# Patient Record
Sex: FEMALE | Race: WHITE | Employment: OTHER | URBAN - NONMETROPOLITAN AREA
[De-identification: names, ages, dates, MRNs, and addresses within clinical notes are randomized per-mention and may not be internally consistent; named-entity substitution may affect disease eponyms.]

---

## 2020-12-02 ENCOUNTER — ANESTHESIA EVENT (OUTPATIENT)
Dept: OPERATING ROOM | Age: 85
DRG: 522 | End: 2020-12-02
Payer: MEDICARE

## 2020-12-02 ENCOUNTER — HOSPITAL ENCOUNTER (INPATIENT)
Age: 85
LOS: 3 days | Discharge: NURSING, REHAB OR LONG-TERM CARE | DRG: 522 | End: 2020-12-05
Attending: STUDENT IN AN ORGANIZED HEALTH CARE EDUCATION/TRAINING PROGRAM | Admitting: HOSPITALIST
Payer: MEDICARE

## 2020-12-02 ENCOUNTER — ANESTHESIA (OUTPATIENT)
Dept: OPERATING ROOM | Age: 85
DRG: 522 | End: 2020-12-02
Payer: MEDICARE

## 2020-12-02 VITALS
SYSTOLIC BLOOD PRESSURE: 166 MMHG | RESPIRATION RATE: 32 BRPM | DIASTOLIC BLOOD PRESSURE: 74 MMHG | OXYGEN SATURATION: 91 % | TEMPERATURE: 97.3 F

## 2020-12-02 PROBLEM — S72.001A HIP FRACTURE, RIGHT, CLOSED, INITIAL ENCOUNTER (HCC): Status: ACTIVE | Noted: 2020-12-02

## 2020-12-02 LAB
ANION GAP SERPL CALCULATED.3IONS-SCNC: 8 MMOL/L (ref 7–19)
BUN BLDV-MCNC: 30 MG/DL (ref 8–23)
CALCIUM SERPL-MCNC: 9.5 MG/DL (ref 8.8–10.2)
CHLORIDE BLD-SCNC: 106 MMOL/L (ref 98–111)
CO2: 26 MMOL/L (ref 22–29)
CREAT SERPL-MCNC: 1 MG/DL (ref 0.5–0.9)
GFR AFRICAN AMERICAN: >59
GFR NON-AFRICAN AMERICAN: 53
GLUCOSE BLD-MCNC: 147 MG/DL (ref 74–109)
HCT VFR BLD CALC: 31.8 % (ref 37–47)
HEMOGLOBIN: 10.2 G/DL (ref 12–16)
MCH RBC QN AUTO: 30.1 PG (ref 27–31)
MCHC RBC AUTO-ENTMCNC: 32.1 G/DL (ref 33–37)
MCV RBC AUTO: 93.8 FL (ref 81–99)
PDW BLD-RTO: 13.2 % (ref 11.5–14.5)
PLATELET # BLD: 302 K/UL (ref 130–400)
PMV BLD AUTO: 10.6 FL (ref 9.4–12.3)
POTASSIUM REFLEX MAGNESIUM: 4.4 MMOL/L (ref 3.5–5)
RBC # BLD: 3.39 M/UL (ref 4.2–5.4)
SODIUM BLD-SCNC: 140 MMOL/L (ref 136–145)
WBC # BLD: 14.2 K/UL (ref 4.8–10.8)

## 2020-12-02 PROCEDURE — 6360000002 HC RX W HCPCS: Performed by: ORTHOPAEDIC SURGERY

## 2020-12-02 PROCEDURE — 2580000003 HC RX 258: Performed by: NURSE ANESTHETIST, CERTIFIED REGISTERED

## 2020-12-02 PROCEDURE — 2580000003 HC RX 258: Performed by: HOSPITALIST

## 2020-12-02 PROCEDURE — 0SRR0JA REPLACEMENT OF RIGHT HIP JOINT, FEMORAL SURFACE WITH SYNTHETIC SUBSTITUTE, UNCEMENTED, OPEN APPROACH: ICD-10-PCS | Performed by: ORTHOPAEDIC SURGERY

## 2020-12-02 PROCEDURE — 80048 BASIC METABOLIC PNL TOTAL CA: CPT

## 2020-12-02 PROCEDURE — 6360000002 HC RX W HCPCS: Performed by: HOSPITALIST

## 2020-12-02 PROCEDURE — 7100000000 HC PACU RECOVERY - FIRST 15 MIN: Performed by: ORTHOPAEDIC SURGERY

## 2020-12-02 PROCEDURE — 7100000001 HC PACU RECOVERY - ADDTL 15 MIN: Performed by: ORTHOPAEDIC SURGERY

## 2020-12-02 PROCEDURE — 1210000000 HC MED SURG R&B

## 2020-12-02 PROCEDURE — 85027 COMPLETE CBC AUTOMATED: CPT

## 2020-12-02 PROCEDURE — 2700000000 HC OXYGEN THERAPY PER DAY

## 2020-12-02 PROCEDURE — 36415 COLL VENOUS BLD VENIPUNCTURE: CPT

## 2020-12-02 PROCEDURE — 6370000000 HC RX 637 (ALT 250 FOR IP): Performed by: ORTHOPAEDIC SURGERY

## 2020-12-02 PROCEDURE — 3600000015 HC SURGERY LEVEL 5 ADDTL 15MIN: Performed by: ORTHOPAEDIC SURGERY

## 2020-12-02 PROCEDURE — 3600000005 HC SURGERY LEVEL 5 BASE: Performed by: ORTHOPAEDIC SURGERY

## 2020-12-02 PROCEDURE — 3700000000 HC ANESTHESIA ATTENDED CARE: Performed by: ORTHOPAEDIC SURGERY

## 2020-12-02 PROCEDURE — 6360000002 HC RX W HCPCS: Performed by: NURSE ANESTHETIST, CERTIFIED REGISTERED

## 2020-12-02 PROCEDURE — 3700000001 HC ADD 15 MINUTES (ANESTHESIA): Performed by: ORTHOPAEDIC SURGERY

## 2020-12-02 PROCEDURE — 2580000003 HC RX 258: Performed by: ORTHOPAEDIC SURGERY

## 2020-12-02 PROCEDURE — C1776 JOINT DEVICE (IMPLANTABLE): HCPCS | Performed by: ORTHOPAEDIC SURGERY

## 2020-12-02 PROCEDURE — 2500000003 HC RX 250 WO HCPCS: Performed by: NURSE ANESTHETIST, CERTIFIED REGISTERED

## 2020-12-02 PROCEDURE — 2709999900 HC NON-CHARGEABLE SUPPLY: Performed by: ORTHOPAEDIC SURGERY

## 2020-12-02 DEVICE — IMPLANTABLE DEVICE: Type: IMPLANTABLE DEVICE | Site: HIP | Status: FUNCTIONAL

## 2020-12-02 DEVICE — HEAD BPLR OD49MM ID28MM FEM HIP SELF CNTR: Type: IMPLANTABLE DEVICE | Site: HIP | Status: FUNCTIONAL

## 2020-12-02 DEVICE — HEAD FEM DIA28MM NK L+1.5MM HIP MTL ON MTL 12/14 TAPR: Type: IMPLANTABLE DEVICE | Site: HIP | Status: FUNCTIONAL

## 2020-12-02 RX ORDER — TRAMADOL HYDROCHLORIDE 50 MG/1
50 TABLET ORAL 2 TIMES DAILY
Status: DISCONTINUED | OUTPATIENT
Start: 2020-12-02 | End: 2020-12-05 | Stop reason: HOSPADM

## 2020-12-02 RX ORDER — SODIUM CHLORIDE 0.9 % (FLUSH) 0.9 %
10 SYRINGE (ML) INJECTION PRN
Status: DISCONTINUED | OUTPATIENT
Start: 2020-12-02 | End: 2020-12-05 | Stop reason: HOSPADM

## 2020-12-02 RX ORDER — SODIUM CHLORIDE 9 MG/ML
INJECTION, SOLUTION INTRAVENOUS CONTINUOUS
Status: DISCONTINUED | OUTPATIENT
Start: 2020-12-02 | End: 2020-12-05 | Stop reason: HOSPADM

## 2020-12-02 RX ORDER — ESCITALOPRAM OXALATE 10 MG/1
10 TABLET ORAL DAILY
Status: DISCONTINUED | OUTPATIENT
Start: 2020-12-02 | End: 2020-12-05 | Stop reason: HOSPADM

## 2020-12-02 RX ORDER — CEFAZOLIN SODIUM 1 G/3ML
INJECTION, POWDER, FOR SOLUTION INTRAMUSCULAR; INTRAVENOUS PRN
Status: DISCONTINUED | OUTPATIENT
Start: 2020-12-02 | End: 2020-12-02 | Stop reason: SDUPTHER

## 2020-12-02 RX ORDER — DONEPEZIL HYDROCHLORIDE 5 MG/1
10 TABLET, FILM COATED ORAL NIGHTLY
Status: DISCONTINUED | OUTPATIENT
Start: 2020-12-02 | End: 2020-12-05 | Stop reason: HOSPADM

## 2020-12-02 RX ORDER — SODIUM CHLORIDE 0.9 % (FLUSH) 0.9 %
10 SYRINGE (ML) INJECTION PRN
Status: DISCONTINUED | OUTPATIENT
Start: 2020-12-02 | End: 2020-12-02

## 2020-12-02 RX ORDER — LISINOPRIL 10 MG/1
10 TABLET ORAL DAILY
Status: DISCONTINUED | OUTPATIENT
Start: 2020-12-02 | End: 2020-12-05 | Stop reason: HOSPADM

## 2020-12-02 RX ORDER — MAGNESIUM SULFATE IN WATER 40 MG/ML
2 INJECTION, SOLUTION INTRAVENOUS PRN
Status: DISCONTINUED | OUTPATIENT
Start: 2020-12-02 | End: 2020-12-05 | Stop reason: HOSPADM

## 2020-12-02 RX ORDER — ONDANSETRON 2 MG/ML
4 INJECTION INTRAMUSCULAR; INTRAVENOUS EVERY 6 HOURS PRN
Status: DISCONTINUED | OUTPATIENT
Start: 2020-12-02 | End: 2020-12-05 | Stop reason: HOSPADM

## 2020-12-02 RX ORDER — NABUMETONE 500 MG/1
500 TABLET, FILM COATED ORAL 2 TIMES DAILY
COMMUNITY

## 2020-12-02 RX ORDER — ONDANSETRON 2 MG/ML
4 INJECTION INTRAMUSCULAR; INTRAVENOUS EVERY 6 HOURS PRN
Status: DISCONTINUED | OUTPATIENT
Start: 2020-12-02 | End: 2020-12-02

## 2020-12-02 RX ORDER — OXYCODONE HYDROCHLORIDE 5 MG/1
5 TABLET ORAL EVERY 4 HOURS PRN
Status: DISCONTINUED | OUTPATIENT
Start: 2020-12-02 | End: 2020-12-05 | Stop reason: HOSPADM

## 2020-12-02 RX ORDER — TRAMADOL HYDROCHLORIDE 50 MG/1
50 TABLET ORAL 2 TIMES DAILY
Status: ON HOLD | COMMUNITY
End: 2020-12-05 | Stop reason: SDUPTHER

## 2020-12-02 RX ORDER — OXYCODONE HYDROCHLORIDE 5 MG/1
10 TABLET ORAL EVERY 4 HOURS PRN
Status: DISCONTINUED | OUTPATIENT
Start: 2020-12-02 | End: 2020-12-03

## 2020-12-02 RX ORDER — VITAMIN B COMPLEX
2000 TABLET ORAL DAILY
Status: DISCONTINUED | OUTPATIENT
Start: 2020-12-02 | End: 2020-12-05 | Stop reason: HOSPADM

## 2020-12-02 RX ORDER — ACETAMINOPHEN 325 MG/1
650 TABLET ORAL EVERY 6 HOURS PRN
Status: DISCONTINUED | OUTPATIENT
Start: 2020-12-02 | End: 2020-12-05 | Stop reason: HOSPADM

## 2020-12-02 RX ORDER — ROCURONIUM BROMIDE 10 MG/ML
INJECTION, SOLUTION INTRAVENOUS PRN
Status: DISCONTINUED | OUTPATIENT
Start: 2020-12-02 | End: 2020-12-02 | Stop reason: SDUPTHER

## 2020-12-02 RX ORDER — MEMANTINE HYDROCHLORIDE 28 MG/1
28 CAPSULE, EXTENDED RELEASE ORAL NIGHTLY
Status: DISCONTINUED | OUTPATIENT
Start: 2020-12-02 | End: 2020-12-05 | Stop reason: HOSPADM

## 2020-12-02 RX ORDER — PROMETHAZINE HYDROCHLORIDE 12.5 MG/1
12.5 TABLET ORAL EVERY 6 HOURS PRN
Status: DISCONTINUED | OUTPATIENT
Start: 2020-12-02 | End: 2020-12-02

## 2020-12-02 RX ORDER — HYDROMORPHONE HYDROCHLORIDE 1 MG/ML
0.5 INJECTION, SOLUTION INTRAMUSCULAR; INTRAVENOUS; SUBCUTANEOUS EVERY 4 HOURS PRN
Status: DISCONTINUED | OUTPATIENT
Start: 2020-12-02 | End: 2020-12-05 | Stop reason: HOSPADM

## 2020-12-02 RX ORDER — PROMETHAZINE HYDROCHLORIDE 12.5 MG/1
12.5 TABLET ORAL EVERY 6 HOURS PRN
Status: DISCONTINUED | OUTPATIENT
Start: 2020-12-02 | End: 2020-12-05 | Stop reason: HOSPADM

## 2020-12-02 RX ORDER — ESOMEPRAZOLE MAGNESIUM 40 MG/1
40 CAPSULE, DELAYED RELEASE ORAL
COMMUNITY

## 2020-12-02 RX ORDER — LIDOCAINE HYDROCHLORIDE 10 MG/ML
INJECTION, SOLUTION EPIDURAL; INFILTRATION; INTRACAUDAL; PERINEURAL PRN
Status: DISCONTINUED | OUTPATIENT
Start: 2020-12-02 | End: 2020-12-02 | Stop reason: SDUPTHER

## 2020-12-02 RX ORDER — ATORVASTATIN CALCIUM 20 MG/1
20 TABLET, FILM COATED ORAL DAILY
Status: DISCONTINUED | OUTPATIENT
Start: 2020-12-02 | End: 2020-12-05 | Stop reason: HOSPADM

## 2020-12-02 RX ORDER — FENTANYL CITRATE 50 UG/ML
INJECTION, SOLUTION INTRAMUSCULAR; INTRAVENOUS PRN
Status: DISCONTINUED | OUTPATIENT
Start: 2020-12-02 | End: 2020-12-02 | Stop reason: SDUPTHER

## 2020-12-02 RX ORDER — LISINOPRIL 10 MG/1
10 TABLET ORAL DAILY
COMMUNITY

## 2020-12-02 RX ORDER — ESCITALOPRAM OXALATE 10 MG/1
10 TABLET ORAL DAILY
COMMUNITY

## 2020-12-02 RX ORDER — DEXAMETHASONE SODIUM PHOSPHATE 10 MG/ML
INJECTION, SOLUTION INTRAMUSCULAR; INTRAVENOUS PRN
Status: DISCONTINUED | OUTPATIENT
Start: 2020-12-02 | End: 2020-12-02 | Stop reason: SDUPTHER

## 2020-12-02 RX ORDER — PROPOFOL 10 MG/ML
INJECTION, EMULSION INTRAVENOUS PRN
Status: DISCONTINUED | OUTPATIENT
Start: 2020-12-02 | End: 2020-12-02 | Stop reason: SDUPTHER

## 2020-12-02 RX ORDER — AMOXICILLIN 250 MG
1 CAPSULE ORAL DAILY PRN
COMMUNITY

## 2020-12-02 RX ORDER — SODIUM CHLORIDE 9 MG/ML
INJECTION, SOLUTION INTRAVENOUS CONTINUOUS
Status: DISCONTINUED | OUTPATIENT
Start: 2020-12-02 | End: 2020-12-02

## 2020-12-02 RX ORDER — ACETAMINOPHEN 160 MG
2000 TABLET,DISINTEGRATING ORAL DAILY
COMMUNITY

## 2020-12-02 RX ORDER — SENNA AND DOCUSATE SODIUM 50; 8.6 MG/1; MG/1
1 TABLET, FILM COATED ORAL 2 TIMES DAILY
Status: DISCONTINUED | OUTPATIENT
Start: 2020-12-02 | End: 2020-12-05 | Stop reason: HOSPADM

## 2020-12-02 RX ORDER — HYDROMORPHONE HYDROCHLORIDE 1 MG/ML
0.5 INJECTION, SOLUTION INTRAMUSCULAR; INTRAVENOUS; SUBCUTANEOUS
Status: DISCONTINUED | OUTPATIENT
Start: 2020-12-02 | End: 2020-12-03

## 2020-12-02 RX ORDER — MEMANTINE HYDROCHLORIDE AND DONEPEZIL HYDROCHLORIDE 28; 10 MG/1; MG/1
1 CAPSULE ORAL DAILY
COMMUNITY

## 2020-12-02 RX ORDER — POTASSIUM CHLORIDE 7.45 MG/ML
10 INJECTION INTRAVENOUS PRN
Status: DISCONTINUED | OUTPATIENT
Start: 2020-12-02 | End: 2020-12-05 | Stop reason: HOSPADM

## 2020-12-02 RX ORDER — ACETAMINOPHEN 650 MG/1
650 SUPPOSITORY RECTAL EVERY 6 HOURS PRN
Status: DISCONTINUED | OUTPATIENT
Start: 2020-12-02 | End: 2020-12-05 | Stop reason: HOSPADM

## 2020-12-02 RX ORDER — BISACODYL 10 MG
10 SUPPOSITORY, RECTAL RECTAL DAILY PRN
Status: DISCONTINUED | OUTPATIENT
Start: 2020-12-02 | End: 2020-12-05 | Stop reason: HOSPADM

## 2020-12-02 RX ORDER — POTASSIUM CHLORIDE 20 MEQ/1
40 TABLET, EXTENDED RELEASE ORAL PRN
Status: DISCONTINUED | OUTPATIENT
Start: 2020-12-02 | End: 2020-12-05 | Stop reason: HOSPADM

## 2020-12-02 RX ORDER — ATORVASTATIN CALCIUM 20 MG/1
20 TABLET, FILM COATED ORAL DAILY
COMMUNITY

## 2020-12-02 RX ORDER — ONDANSETRON 2 MG/ML
INJECTION INTRAMUSCULAR; INTRAVENOUS PRN
Status: DISCONTINUED | OUTPATIENT
Start: 2020-12-02 | End: 2020-12-02 | Stop reason: SDUPTHER

## 2020-12-02 RX ORDER — SODIUM CHLORIDE 0.9 % (FLUSH) 0.9 %
10 SYRINGE (ML) INJECTION EVERY 12 HOURS SCHEDULED
Status: DISCONTINUED | OUTPATIENT
Start: 2020-12-02 | End: 2020-12-05 | Stop reason: HOSPADM

## 2020-12-02 RX ORDER — PANTOPRAZOLE SODIUM 40 MG/1
40 TABLET, DELAYED RELEASE ORAL
Status: DISCONTINUED | OUTPATIENT
Start: 2020-12-02 | End: 2020-12-05 | Stop reason: HOSPADM

## 2020-12-02 RX ORDER — SODIUM CHLORIDE 0.9 % (FLUSH) 0.9 %
10 SYRINGE (ML) INJECTION EVERY 12 HOURS SCHEDULED
Status: DISCONTINUED | OUTPATIENT
Start: 2020-12-02 | End: 2020-12-02

## 2020-12-02 RX ORDER — SODIUM CHLORIDE, SODIUM LACTATE, POTASSIUM CHLORIDE, CALCIUM CHLORIDE 600; 310; 30; 20 MG/100ML; MG/100ML; MG/100ML; MG/100ML
INJECTION, SOLUTION INTRAVENOUS CONTINUOUS PRN
Status: DISCONTINUED | OUTPATIENT
Start: 2020-12-02 | End: 2020-12-02 | Stop reason: SDUPTHER

## 2020-12-02 RX ADMIN — SODIUM CHLORIDE: 9 INJECTION, SOLUTION INTRAVENOUS at 08:10

## 2020-12-02 RX ADMIN — FENTANYL CITRATE 25 MCG: 50 INJECTION INTRAMUSCULAR; INTRAVENOUS at 15:43

## 2020-12-02 RX ADMIN — CEFAZOLIN SODIUM 2000 MG: 1 INJECTION, POWDER, FOR SOLUTION INTRAMUSCULAR; INTRAVENOUS at 14:41

## 2020-12-02 RX ADMIN — PROPOFOL 100 MG: 10 INJECTION, EMULSION INTRAVENOUS at 14:21

## 2020-12-02 RX ADMIN — SUGAMMADEX 200 MG: 100 INJECTION, SOLUTION INTRAVENOUS at 15:42

## 2020-12-02 RX ADMIN — DONEPEZIL HYDROCHLORIDE 10 MG: 5 TABLET, FILM COATED ORAL at 21:43

## 2020-12-02 RX ADMIN — SODIUM CHLORIDE: 9 INJECTION, SOLUTION INTRAVENOUS at 21:44

## 2020-12-02 RX ADMIN — SODIUM CHLORIDE: 9 INJECTION, SOLUTION INTRAVENOUS at 17:33

## 2020-12-02 RX ADMIN — CEFTRIAXONE 1 G: 1 INJECTION, POWDER, FOR SOLUTION INTRAMUSCULAR; INTRAVENOUS at 09:45

## 2020-12-02 RX ADMIN — LIDOCAINE HYDROCHLORIDE 50 MG: 10 INJECTION, SOLUTION EPIDURAL; INFILTRATION; INTRACAUDAL; PERINEURAL at 14:21

## 2020-12-02 RX ADMIN — HYDROMORPHONE HYDROCHLORIDE 0.5 MG: 1 INJECTION, SOLUTION INTRAMUSCULAR; INTRAVENOUS; SUBCUTANEOUS at 09:41

## 2020-12-02 RX ADMIN — Medication 2 G: at 21:45

## 2020-12-02 RX ADMIN — TRAMADOL HYDROCHLORIDE 50 MG: 50 TABLET, FILM COATED ORAL at 21:43

## 2020-12-02 RX ADMIN — MEMANTINE HYDROCHLORIDE 28 MG: 28 CAPSULE, EXTENDED RELEASE ORAL at 21:43

## 2020-12-02 RX ADMIN — ONDANSETRON HYDROCHLORIDE 4 MG: 2 INJECTION, SOLUTION INTRAMUSCULAR; INTRAVENOUS at 15:42

## 2020-12-02 RX ADMIN — SODIUM CHLORIDE, SODIUM LACTATE, POTASSIUM CHLORIDE, AND CALCIUM CHLORIDE: 600; 310; 30; 20 INJECTION, SOLUTION INTRAVENOUS at 14:13

## 2020-12-02 RX ADMIN — FENTANYL CITRATE 25 MCG: 50 INJECTION INTRAMUSCULAR; INTRAVENOUS at 14:45

## 2020-12-02 RX ADMIN — FENTANYL CITRATE 50 MCG: 50 INJECTION INTRAMUSCULAR; INTRAVENOUS at 14:21

## 2020-12-02 RX ADMIN — ROCURONIUM BROMIDE 50 MG: 10 INJECTION, SOLUTION INTRAVENOUS at 14:21

## 2020-12-02 RX ADMIN — DEXAMETHASONE SODIUM PHOSPHATE 10 MG: 10 INJECTION, SOLUTION INTRAMUSCULAR; INTRAVENOUS at 14:27

## 2020-12-02 RX ADMIN — PHENYLEPHRINE HYDROCHLORIDE 80 MCG: 10 INJECTION INTRAVENOUS at 15:33

## 2020-12-02 ASSESSMENT — PAIN SCALES - GENERAL
PAINLEVEL_OUTOF10: 0
PAINLEVEL_OUTOF10: 4
PAINLEVEL_OUTOF10: 0

## 2020-12-02 ASSESSMENT — LIFESTYLE VARIABLES: SMOKING_STATUS: 0

## 2020-12-02 NOTE — CONSULTS
Orthopaedic Surgery  Inpatient Consultation    Dionisio Wise ([id number])  12/2/2020    Requesting Physician: DR Kamille Bishop Physician: DR Gunnar Edmonds  12/2/2020  6:10 AM    CHIEF COMPLAINT:  right HIP FX S/P FALL AT NH    History Obtained From:  CHART    HISTORY OF PRESENT ILLNESS:                The patient is a 82 y.o. female who presents with above chief complaint. PT WITH SEVERE DEMENTIA. HX OBTAINED FROM RECORDS. PT FELL AT [address] [facility], WAS TAKEN TO [facility] AND CT REVEALED AN IMPACTED RIGHT FEMORAL NECK FX. PT TRANSFERRED HERE FOR SURGERY. Past Medical History:        Diagnosis Date    Abdominal aortic aneurysm (AAA) (HCC)     Chronic kidney disease     Dementia ([facility] [address]     Dementia (HCC)     Hyperlipidemia     Hypertension     Movement disorder        Past Surgical History:        Procedure Laterality Date    FRACTURE SURGERY         Current Medications:   Current Facility-Administered Medications: cefTRIAXone (ROCEPHIN) 1 g in sterile water 10 mL IV syringe, 1 g, Intravenous, Q24H  Prior to Admission medications    Medication Sig Start Date End Date Taking? Authorizing Provider   mirabegron (MYRBETRIQ) 25 MG TB24 Take 25 mg by mouth daily   Yes Historical Provider, MD   escitalopram (LEXAPRO) 10 MG tablet Take 10 mg by mouth daily   Yes Historical Provider, MD   lisinopril (PRINIVIL;ZESTRIL) 10 MG tablet Take 10 mg by mouth daily   Yes Historical Provider, MD   traMADol (ULTRAM) 50 MG tablet Take 50 mg by mouth 2 times daily.    Yes Historical Provider, MD   Cholecalciferol (VITAMIN D3) 50 MCG (2000 UT) CAPS Take 2,000 Units by mouth daily   Yes Historical Provider, MD   senna-docusate (PERICOLACE) 8.6-50 MG per tablet Take 1 tablet by mouth daily as needed for Constipation   Yes Historical Provider, MD   Memantine HCl-Donepezil HCl (NAMZARIC) 28-10 MG CP24 Take 1 tablet by mouth daily   Yes Historical Provider, MD   nabumetone (RELAFEN) 500 MG tablet Take 500 mg by mouth 2 times daily   Yes Historical Provider, MD   esomeprazole (NEXIUM) 40 MG delayed release capsule Take 40 mg by mouth every morning (before breakfast)   Yes Historical Provider, MD   atorvastatin (LIPITOR) 20 MG tablet Take 20 mg by mouth daily   Yes Historical Provider, MD       Allergies:  Patient has no known allergies. Social History:   Social History     Socioeconomic History    Marital status:      Spouse name: Not on file    Number of children: Not on file    Years of education: Not on file    Highest education level: Not on file   Occupational History    Not on file   Social Needs    Financial resource strain: Not on file    Food insecurity     Worry: Not on file     Inability: Not on file    Transportation needs     Medical: Not on file     Non-medical: Not on file   Tobacco Use    Smoking status: Not on file   Substance and Sexual Activity    Alcohol use: Not on file    Drug use: Not on file    Sexual activity: Not on file   Lifestyle    Physical activity     Days per week: Not on file     Minutes per session: Not on file    Stress: Not on file   Relationships    Social connections     Talks on phone: Not on file     Gets together: Not on file     Attends [employer] service: Not on file     Active member of club or organization: Not on file     Attends meetings of clubs or organizations: Not on file     Relationship status: Not on file    Intimate partner violence     Fear of current or ex partner: Not on file     Emotionally abused: Not on file     Physically abused: Not on file     Forced sexual activity: Not on file   Other Topics Concern    Not on file   Social History Narrative    Not on file       Family History:   No family history on file.     REVIEW OF SYSTEMS:    UNABLE TO BE OBTAINED 2/2 DEMENTIA    PHYSICAL EXAM:    Vitals:   Vitals:    12/02/20 0555   BP: (!) 155/77   Pulse: 96   Resp: 17   Temp: 98.7 °F (37.1 °C)   TempSrc: Temporal   Weight: 169 lb (76.7 kg)   Height: 5' 6\"

## 2020-12-02 NOTE — H&P
senna-docusate (PERICOLACE) 8.6-50 MG per tablet Take 1 tablet by mouth daily as needed for Constipation   Yes Historical Provider, MD   Memantine HCl-Donepezil HCl (NAMZARIC) 28-10 MG CP24 Take 1 tablet by mouth daily   Yes Historical Provider, MD   nabumetone (RELAFEN) 500 MG tablet Take 500 mg by mouth 2 times daily   Yes Historical Provider, MD   esomeprazole (NEXIUM) 40 MG delayed release capsule Take 40 mg by mouth every morning (before breakfast)   Yes Historical Provider, MD   atorvastatin (LIPITOR) 20 MG tablet Take 20 mg by mouth daily   Yes Historical Provider, MD        Allergies   Patient has no known allergies. Social History     Social History     Tobacco History     Smoking Status  Never Assessed    Smokeless Tobacco Use  Unknown          Alcohol History     Alcohol Use Status  Not Asked          Drug Use     Drug Use Status  Not Asked          Sexual Activity     Sexually Active  Not Asked                Family History   No family history on file. Review of Systems   Review of Systems: Unable to fully obtain from patient. Physical Exam   BP (!) 146/70   Pulse 88   Temp 97.1 °F (36.2 °C) (Temporal)   Resp 16   Ht 5' 6\" (1.676 m)   Wt 169 lb (76.7 kg)   SpO2 95%   BMI 27.28 kg/m²       Physical Exam:  General: Alert, well-developed, no acute distress lying comfortably in bed. HEENT: Atraumatic normocephalic, range of motion normal, no tracheal deviation noted. Cardiac: Normal G1-F5 with systolic murmur. Respiratory: From supine position, effort normal, breath sounds normal, clear To auscultation bilaterally, no rhonchi or rales, no wheezing  Abdomen: Soft, positive bowel sounds in all quadrants, no distention, nontender to palpation, no organomegaly noted, no rebound noted. MSK/extremities: no edema, no deformity, leg leg discrepancy noted. Minimal right lower extremity movement. Skin: Warm, no erythema noted, no bruising and no lesions noted.   Psych: Affect normal and good eye contact, behavioral normal  Neurological: No focal deficits, alert and conversant, No sensory deficits. Labs      Recent Results (from the past 24 hour(s))   Basic Metabolic Panel w/ Reflex to MG    Collection Time: 12/02/20  7:29 AM   Result Value Ref Range    Sodium 140 136 - 145 mmol/L    Potassium reflex Magnesium 4.4 3.5 - 5.0 mmol/L    Chloride 106 98 - 111 mmol/L    CO2 26 22 - 29 mmol/L    Anion Gap 8 7 - 19 mmol/L    Glucose 147 (H) 74 - 109 mg/dL    BUN 30 (H) 8 - 23 mg/dL    CREATININE 1.0 (H) 0.5 - 0.9 mg/dL    GFR Non- 53 (A) >60    GFR African American >59 >59    Calcium 9.5 8.8 - 10.2 mg/dL   CBC    Collection Time: 12/02/20  7:29 AM   Result Value Ref Range    WBC 14.2 (H) 4.8 - 10.8 K/uL    RBC 3.39 (L) 4.20 - 5.40 M/uL    Hemoglobin 10.2 (L) 12.0 - 16.0 g/dL    Hematocrit 31.8 (L) 37.0 - 47.0 %    MCV 93.8 81.0 - 99.0 fL    MCH 30.1 27.0 - 31.0 pg    MCHC 32.1 (L) 33.0 - 37.0 g/dL    RDW 13.2 11.5 - 14.5 %    Platelets 472 017 - 253 K/uL    MPV 10.6 9.4 - 12.3 fL        Imaging/Diagnostics Last 24 Hours   No results found. Assessment      Hospital Problems           Last Modified POA    Hip fracture, right, closed, initial encounter ([facility] [address] 12/2/2020 Yes          Plan       Right hip fracture status post fall in setting of movement disorder. Orthopedic consulted plan for this afternoon. Hold anticoagulation for now due to surgery  N.p.o.  PT OT post surgery  Pain management  Bowel regimen to prevent constipation. Hypertension: Continue lisinopril    Hyperlipidemia: Statin. UTI: Continue ceftriaxone for total of 3 days. Chronic kidney disease    GERD: Pantoprazole    Dementia: continue Aricept and namenda      Code: Full  DVT prophylaxis: Per Ortho recommendations post surgery  Diet: N.p.o. to post surgery  Disposition: Pending therapy evaluation. Likely will need SNF.         Consultations Ordered:  IP CONSULT TO ORTHOPEDIC SURGERY  IP CONSULT TO SOCIAL WORK  IP

## 2020-12-02 NOTE — PROGRESS NOTES
Denise Gaona arrived to room # 21 [phone]. Presented with: Rt Femoral Neck Fx  Mental Status: Patient is disoriented,  There were no vitals filed for this visit. Patient safety contract and falls prevention contract reviewed with patient Yes. Oriented Patient to room. Call light within reach. Yes.   Needs, issues or concerns expressed at this time: no.      Electronically signed by Marina Lainez RN on 12/2/2020 at 6:13 AM

## 2020-12-02 NOTE — PROGRESS NOTES
4 Eyes Skin Assessment    Dayna Salazar is being assessed upon: Admission    I agree that I, Irasema Aden, along with Aubrey Huang RN (either 2 RN's or 1 LPN and 1 RN) have performed a thorough Head to Toe Skin Assessment on the patient. ALL assessment sites listed below have been assessed. Areas assessed by both nurses:     [x]   Head, Face, and Ears   [x]   Shoulders, Back, and Chest  [x]   Arms, Elbows, and Hands   [x]   Coccyx, Sacrum, and Ischium  [x]   Legs, Feet, and Heels    Does the Patient have Skin Breakdown?  No    Alfredo Prevention initiated: Yes  Wound Care Orders initiated: NA    [facility] nurse consulted for Pressure Injury (Stage 3,4, Unstageable, DTI, NWPT, and Complex wounds) and New or Established Ostomies: NA        Primary Nurse eSignature: Kelby Martinez RN on 12/2/2020 at 6:12 AM      Co-Signer eSignature: Electronically signed by Aubrey Huang RN on 12/2/20 at 6:16 AM CST

## 2020-12-02 NOTE — ANESTHESIA PRE PROCEDURE
Department of Anesthesiology  Preprocedure Note       Name:  Dionisio Wise   Age:  82 y.o.  :  1935                                          MRN:  [id number]         Date:  2020      Surgeon: Jammie Murry):  Marcus Mulligan MD    Procedure: Procedure(s):  HIP HEMIARTHROPLASTY    Medications prior to admission:   Prior to Admission medications    Medication Sig Start Date End Date Taking? Authorizing Provider   mirabegron (MYRBETRIQ) 25 MG TB24 Take 25 mg by mouth daily   Yes Historical Provider, MD   escitalopram (LEXAPRO) 10 MG tablet Take 10 mg by mouth daily   Yes Historical Provider, MD   lisinopril (PRINIVIL;ZESTRIL) 10 MG tablet Take 10 mg by mouth daily   Yes Historical Provider, MD   traMADol (ULTRAM) 50 MG tablet Take 50 mg by mouth 2 times daily.    Yes Historical Provider, MD   Cholecalciferol (VITAMIN D3) 50 MCG (2000 UT) CAPS Take 2,000 Units by mouth daily   Yes Historical Provider, MD   senna-docusate (Jose Ket) 8.6-50 MG per tablet Take 1 tablet by mouth daily as needed for Constipation   Yes Historical Provider, MD   Memantine HCl-Donepezil HCl (NAMZARIC) 28-10 MG CP24 Take 1 tablet by mouth daily   Yes Historical Provider, MD   nabumetone (RELAFEN) 500 MG tablet Take 500 mg by mouth 2 times daily   Yes Historical Provider, MD   esomeprazole (NEXIUM) 40 MG delayed release capsule Take 40 mg by mouth every morning (before breakfast)   Yes Historical Provider, MD   atorvastatin (LIPITOR) 20 MG tablet Take 20 mg by mouth daily   Yes Historical Provider, MD       Current medications:    Current Facility-Administered Medications   Medication Dose Route Frequency Provider Last Rate Last Dose    [MAR Hold] cefTRIAXone (ROCEPHIN) 1 g in sterile water 10 mL IV syringe  1 g Intravenous Q24H Genaro Sweeney MD   1 g at 20 0945    [MAR Hold] atorvastatin (LIPITOR) tablet 20 mg  20 mg Oral Daily Genaro Sweeney MD        [facility] Hold] Vitamin D (CHOLECALCIFEROL) tablet 2,000 Units  2,000 Units Oral Daily Antolin March MD        [facility] Hold] escitalopram (LEXAPRO) tablet 10 mg  10 mg Oral Daily Antolin March MD        [facility] Hold] pantoprazole (PROTONIX) tablet 40 mg  40 mg Oral QAM AC Antolin March MD        [facility] Hold] lisinopril (PRINIVIL;ZESTRIL) tablet 10 mg  10 mg Oral Daily Antolin March MD        [facility] Hold] mirabegron [facility]) extended release tablet 25 mg  25 mg Oral Daily Antolin March MD        [facility] Hold] sennosides-docusate sodium (SENOKOT-S) 8.6-50 MG tablet 1 tablet  1 tablet Oral BID Antolin March MD        [facility] Hold] traMADol [place]) tablet 50 mg  50 mg Oral BID Antolin March MD        [facility] Hold] sodium chloride flush 0.9 % injection 10 mL  10 mL Intravenous 2 times per day Antolin March MD        [facility] Hold] sodium chloride flush 0.9 % injection 10 mL  10 mL Intravenous PRN Antolin March MD        [facility] Hold] acetaminophen (TYLENOL) tablet 650 mg  650 mg Oral Q6H PRN Antolin March MD        Or   Susan [facility] Hold] acetaminophen (TYLENOL) suppository 650 mg  650 mg Rectal Q6H PRN Antolin March MD        [facility] Hold] promethazine (PHENERGAN) tablet 12.5 mg  12.5 mg Oral Q6H PRN Antolin March MD        Or   Akshatryan [facility] Hold] ondansetron [facility]) injection 4 mg  4 mg Intravenous Q6H PRN Antolin March MD        [facility] Hold] potassium chloride (KLOR-CON M) extended release tablet 40 mEq  40 mEq Oral PRN Antolin March MD        Or   [facility] Hold] potassium bicarb-citric acid (EFFER-K) effervescent tablet 40 mEq  40 mEq Oral PRN Antolin March MD        Or   [facility]] potassium chloride 10 mEq/100 mL IVPB (Peripheral Line)  10 mEq Intravenous PRN Antolin March MD        [facility] Hold] magnesium sulfate 2 g in 50 mL IVPB premix  2 g Intravenous PRN Antolin March MD        [facility] Hold] bisacodyl (DULCOLAX) suppository 10 mg  10 mg Rectal Daily PRN Antolin March MD        [facility] Hold] 0.9 % sodium chloride infusion   Intravenous Continuous Antolin Macrh  RDW 13.2 12/02/2020     12/02/2020       CMP:   Lab Results   Component Value Date     12/02/2020    K 4.4 12/02/2020     12/02/2020    CO2 26 12/02/2020    BUN 30 12/02/2020    CREATININE 1.0 12/02/2020    GFRAA >59 12/02/2020    LABGLOM 53 12/02/2020    GLUCOSE 147 12/02/2020    CALCIUM 9.5 12/02/2020       POC Tests: No results for input(s): POCGLU, POCNA, POCK, POCCL, POCBUN, POCHEMO, POCHCT in the last 72 hours. Coags: No results found for: PROTIME, INR, APTT    HCG (If Applicable): No results found for: PREGTESTUR, PREGSERUM, HCG, HCGQUANT     ABGs: No results found for: PHART, PO2ART, ILG9PZA, BKD0WEQ, BEART, Z9DBDBGL     Type & Screen (If Applicable):  No results found for: LABABO, LABRH    Drug/Infectious Status (If Applicable):  No results found for: HIV, HEPCAB    COVID-19 Screening (If Applicable): No results found for: COVID19      Anesthesia Evaluation  Patient summary reviewed and Nursing notes reviewed no history of anesthetic complications:   Airway: Mallampati: II  TM distance: >3 FB   Neck ROM: full  Mouth opening: > = 3 FB Dental:          Pulmonary:       (-) not a current smoker                           Cardiovascular:    (+) hypertension:, hyperlipidemia      ECG reviewed               Beta Blocker:  Not on Beta Blocker         Neuro/Psych:   (+) neuromuscular disease:,              ROS comment: dementia GI/Hepatic/Renal:   (+) renal disease: CRI,           Endo/Other:                     Abdominal:           Vascular:   + PVD, aortic or cerebral, . Anesthesia Plan      general     ASA 3       Induction: intravenous. MIPS: Postoperative opioids intended and Prophylactic antiemetics administered. Anesthetic plan and risks discussed with patient. Plan discussed with CRNA.                   Suzette Burns MD   12/2/2020

## 2020-12-02 NOTE — OP NOTE
Operative Note      Patient: Indira Anton  YOB: 1935  MRN: [id number]    Date of Procedure: 12/2/2020    Pt Name: Indira Anton  MRN: [id number]  [place]: 1935  Date: 12/2/2020    PREOPERATIVE DIAGNOSIS:  Right subcapital femoral neck fracture    POSTOPERATIVE DIAGNOSIS:  Right subcapital femoral neck fracture    PROCEDURE:  Procedure(s): Right Hip Hemiarthroplasty      SURGEON:  Cecilia Branch MD    ASSISTANT:  Sabra Sutton    ANESTHESIA:  General    ESTIMATED BLOOD LOSS:  Minimal.    COMPLICATIONS:  None. CONDITION:  Stable. IMPLANTS:  Depuy Trilock size 9 stem, +1.5head, 49 outer bipolar cup    BRIEF HISTORY:  This is an 42-year-old female who fell at the nursing home. The patient presented  to the Emergency Department with hip pain where x-rays demonstrated a femoral  neck fracture at the mid portion of the femoral neck. This was a displaced,  closed fracture. Based on this, decision made to take the patient to the  operating room for the above-mentioned procedure. After risks and benefits  had been discussed with the patient, she agreed to proceed. DESCRIPTION OF PROCEDURE:  The patient was interviewed in the preanesthesia  area where her   operative hip was marked with a marking pen. She was then taken to the  operative suite where general endotracheal anesthesia was performed by the  anesthesia team.  A timeout was then called confirming the patient, the  operative site as well as the planned procedure and administration of  antibiotics. The patient had been positioned in lateral decubitus position  over an axillary roll on a peg board. She was then prepped and draped in  standard sterile fashion using ChloraPrep. Once fully prepped and draped, a standard posterior approach was carried out  to the hip. The IT band and gluteal femoral fascia were divided in line with  its fibers.   The Charnley retractor was placed, the anterior blade beneath the  gluteus medius and the posterior blade retracting the gluteus caitlin. Short  external rotators were then identified. The short external rotators and  capsules were then taken down in a T-type fashion and tagged with a #2 Vicryl  sutures. The femoral head was then removed. It sized on the back table for a size 49. Attention is then turned to the femur. With the femur in an exposed position  with the foot facing the ceiling and a proximal femoral elevator placed behind  the femur, we began initially with a box osteotome and then a canal finding  reamer. We then broached up to a size 9 broach. We did use the calcar planer  to make our neck resection about a fingerbreadth above the lesser trochanter. We did use the lateralizing rasp as well to lateralize. We got excellent  fill with the trial stem matching the patient's own anteversion. A trial reduction was then performed with a +1.5 mm head and a 49 mm outer  bipolar cup. Limb lengths felt symmetric at the level of the patella and  heel. The leg was also stable up to about 75 degrees of internal rotation. It is also stable in the sleeper position. All trial components were removed at this point in time. The hip was  copiously irrigated. We then placed a size 9 DePuy Tri-Lock stem with a +1.5  mm head and a 49 mm outer bipolar cup. The hip was then reduced. Once again,  it was taken through an arc of motion and found to be very stable with  symmetric limb lengths. Attention was then turned to meticulous posterior capsular closure. A drill  bit was used to drill through the trochanter and a Chaney suture passer were  used to pass our sutures. The capsule and short external rotators were then  tied over a bone bridge. We then reinforced the posterior capsule repair with  a #2 Vicryl figure-of-eight sutures. The iliotibial band was then closed with  a #2 Vicryl suture. The deep tissue was approximated with Vicryl.   The skin  was then approximated with 3-0 Vicryl and closed with a Prineo wound closure  system. The patient was placed in an abductor pillow. The patient awoke from  anesthesia without difficulty and transferred to the PACU in stable condition. All sponge, needle, and instrument counts were correct from procedure.         Sarah Farr MD        Electronically signed by Sarah Farr MD on 12/2/2020 at 1:53 PM

## 2020-12-03 LAB
ANION GAP SERPL CALCULATED.3IONS-SCNC: 5 MMOL/L (ref 7–19)
BUN BLDV-MCNC: 25 MG/DL (ref 8–23)
CALCIUM SERPL-MCNC: 9.3 MG/DL (ref 8.8–10.2)
CHLORIDE BLD-SCNC: 109 MMOL/L (ref 98–111)
CO2: 27 MMOL/L (ref 22–29)
CREAT SERPL-MCNC: 0.9 MG/DL (ref 0.5–0.9)
GFR AFRICAN AMERICAN: >59
GFR NON-AFRICAN AMERICAN: 60
GLUCOSE BLD-MCNC: 120 MG/DL (ref 74–109)
HCT VFR BLD CALC: 29.4 % (ref 37–47)
HEMOGLOBIN: 9.1 G/DL (ref 12–16)
MCH RBC QN AUTO: 30.1 PG (ref 27–31)
MCHC RBC AUTO-ENTMCNC: 31 G/DL (ref 33–37)
MCV RBC AUTO: 97.4 FL (ref 81–99)
PDW BLD-RTO: 13.3 % (ref 11.5–14.5)
PLATELET # BLD: 247 K/UL (ref 130–400)
PMV BLD AUTO: 11.1 FL (ref 9.4–12.3)
POTASSIUM REFLEX MAGNESIUM: 4.2 MMOL/L (ref 3.5–5)
RBC # BLD: 3.02 M/UL (ref 4.2–5.4)
REASON FOR REJECTION: NORMAL
REJECTED TEST: NORMAL
SODIUM BLD-SCNC: 141 MMOL/L (ref 136–145)
WBC # BLD: 9.6 K/UL (ref 4.8–10.8)

## 2020-12-03 PROCEDURE — 97530 THERAPEUTIC ACTIVITIES: CPT

## 2020-12-03 PROCEDURE — 80048 BASIC METABOLIC PNL TOTAL CA: CPT

## 2020-12-03 PROCEDURE — 6360000002 HC RX W HCPCS: Performed by: ORTHOPAEDIC SURGERY

## 2020-12-03 PROCEDURE — 36415 COLL VENOUS BLD VENIPUNCTURE: CPT

## 2020-12-03 PROCEDURE — 2580000003 HC RX 258: Performed by: ORTHOPAEDIC SURGERY

## 2020-12-03 PROCEDURE — 6370000000 HC RX 637 (ALT 250 FOR IP): Performed by: ORTHOPAEDIC SURGERY

## 2020-12-03 PROCEDURE — 2700000000 HC OXYGEN THERAPY PER DAY

## 2020-12-03 PROCEDURE — 97165 OT EVAL LOW COMPLEX 30 MIN: CPT

## 2020-12-03 PROCEDURE — 97161 PT EVAL LOW COMPLEX 20 MIN: CPT

## 2020-12-03 PROCEDURE — 97110 THERAPEUTIC EXERCISES: CPT

## 2020-12-03 PROCEDURE — 1210000000 HC MED SURG R&B

## 2020-12-03 PROCEDURE — 85027 COMPLETE CBC AUTOMATED: CPT

## 2020-12-03 RX ADMIN — SODIUM CHLORIDE, PRESERVATIVE FREE 10 ML: 5 INJECTION INTRAVENOUS at 20:52

## 2020-12-03 RX ADMIN — OXYCODONE 5 MG: 5 TABLET ORAL at 13:53

## 2020-12-03 RX ADMIN — ENOXAPARIN SODIUM 40 MG: 40 INJECTION SUBCUTANEOUS at 00:25

## 2020-12-03 RX ADMIN — ATORVASTATIN CALCIUM 20 MG: 20 TABLET, FILM COATED ORAL at 08:43

## 2020-12-03 RX ADMIN — ESCITALOPRAM OXALATE 10 MG: 10 TABLET ORAL at 08:42

## 2020-12-03 RX ADMIN — ENOXAPARIN SODIUM 40 MG: 40 INJECTION SUBCUTANEOUS at 08:42

## 2020-12-03 RX ADMIN — DONEPEZIL HYDROCHLORIDE 10 MG: 5 TABLET, FILM COATED ORAL at 20:52

## 2020-12-03 RX ADMIN — DOCUSATE SODIUM 50 MG AND SENNOSIDES 8.6 MG 1 TABLET: 8.6; 5 TABLET, FILM COATED ORAL at 08:42

## 2020-12-03 RX ADMIN — SODIUM CHLORIDE: 9 INJECTION, SOLUTION INTRAVENOUS at 05:50

## 2020-12-03 RX ADMIN — CEFTRIAXONE 1 G: 1 INJECTION, POWDER, FOR SOLUTION INTRAMUSCULAR; INTRAVENOUS at 08:42

## 2020-12-03 RX ADMIN — TRAMADOL HYDROCHLORIDE 50 MG: 50 TABLET, FILM COATED ORAL at 08:42

## 2020-12-03 RX ADMIN — Medication 2 G: at 10:45

## 2020-12-03 RX ADMIN — TRAMADOL HYDROCHLORIDE 50 MG: 50 TABLET, FILM COATED ORAL at 20:52

## 2020-12-03 RX ADMIN — PANTOPRAZOLE SODIUM 40 MG: 40 TABLET, DELAYED RELEASE ORAL at 08:43

## 2020-12-03 RX ADMIN — LISINOPRIL 10 MG: 10 TABLET ORAL at 08:43

## 2020-12-03 RX ADMIN — SODIUM CHLORIDE, PRESERVATIVE FREE 10 ML: 5 INJECTION INTRAVENOUS at 08:43

## 2020-12-03 RX ADMIN — DOCUSATE SODIUM 50 MG AND SENNOSIDES 8.6 MG 1 TABLET: 8.6; 5 TABLET, FILM COATED ORAL at 20:52

## 2020-12-03 RX ADMIN — CHOLECALCIFEROL (VITAMIN D3) 25 MCG (1,000 UNIT) TABLET 2000 UNITS: TABLET at 08:43

## 2020-12-03 RX ADMIN — MEMANTINE HYDROCHLORIDE 28 MG: 28 CAPSULE, EXTENDED RELEASE ORAL at 20:52

## 2020-12-03 ASSESSMENT — PAIN SCALES - WONG BAKER: WONGBAKER_NUMERICALRESPONSE: 4

## 2020-12-03 ASSESSMENT — PAIN SCALES - GENERAL
PAINLEVEL_OUTOF10: 6
PAINLEVEL_OUTOF10: 4
PAINLEVEL_OUTOF10: 4

## 2020-12-03 NOTE — PROGRESS NOTES
Physical Therapy  Name: Zenaida Guadalupe  MRN:  [id number]  Date of service:  12/3/2020     12/03/20 155   Lower Extremity Weight Bearing Restrictions   Right Lower Extremity Weight Bearing Weight Bearing As Tolerated   Upper Extremity Weight Bearing Restrictions   Other RN, AYAD, CHECK  WITH MD RE WB STATUS AND REPORTS Pt IS TO BE WBAT ON R LE   Position Activity Restriction   Hip Precautions Posterior hip precautions   Subjective   Subjective pt STATES SHE IS READY TO GO BTB AND NEEDS PAIN MEDS. RN INFORMED OF NEED FOR MEDS. General Comment   Comments ABD PILLOW RETURNED TO Pt AND HEELS FLOATED ONCE SHE WAS BTB   Bed Mobility   Sit to Supine Dependent/Total  (ASSIST OF TWO)   Scooting Moderate assistance;2 Person assistance   Transfers   Sit to Stand Moderate Assistance;2 Person Assistance;Minimal Assistance   Stand to sit Minimal Assistance;2 Person Assistance   Bed to Chair Minimal assistance;2 Person Assistance; Moderate assistance   Comment pt HAS DIFFICULT WITH WEIGHT SHIFT AND BACKING TO BED   Other Activities   Comment FOLLOWING PHP'S   Short term goals   Time Frame for Short term goals 2 wks   Short term goal 1 SUP<>SIT, MIN/MOD A 1   Short term goal 2 SIT<>STAND, MIN A 1   Short term goal 3 STAND STEP TF'S WITH RW, MIN A 1   Conditions Requiring Skilled Therapeutic Intervention   Body structures, Functions, Activity limitations Decreased functional mobility ; Decreased endurance; Increased pain;Decreased strength;Decreased posture;Decreased safe awareness;Decreased cognition   Assessment pt COOPERATIVE WITH PT AND WORKING WELL TO PROGRESS MOBILITY POST R HIP ARPAN ARTHROPLASTY   Treatment Initiated  SAFETY, BED MOBILITY, TF'S   Activity Tolerance   Activity Tolerance Patient Tolerated treatment well;Patient limited by cognitive status   Plan   Times per week 5-7   Plan weeks 2   Current Treatment Recommendations Strengthening;Cognitive/Perceptual Training; Safety Education & Training;Balance Training;Functional

## 2020-12-03 NOTE — PROGRESS NOTES
Orthopedic Surgery Progress Note    Ernesto Swan  12/3/2020    Subjective:     Post-Operative Day # 1 s/p right hip hemiarthroplasty    Systemic or Specific Complaints: dementia, no c/o per staff    Objective:     BP (!) 162/74   Pulse 70   Temp 97.6 °F (36.4 °C) (Temporal)   Resp 16   Ht 5' 6\" (1.676 m)   Wt 169 lb (76.7 kg)   SpO2 100%   BMI 27.28 kg/m²     General: alert, appears stated age and cooperative   Wound: clean, dry, intact              Dressing: clean, dry, and intact   Extremity: Distal NVI            DVT Exam: No evidence of DVT seen on physical exam.                   Data Review  CBC:   Lab Results   Component Value Date    WBC 9.6 12/03/2020    RBC 3.02 12/03/2020    HGB 9.1 12/03/2020    HCT 29.4 12/03/2020     12/03/2020       Assessment:     Status Post right hip surgery, doing well     Plan:      1:  DVT prophylaxis  2:  Continue pain control  3:  Physical therapy as per protocol  4:  Anticipate discharge when medically stable. Ok to dc back to NH from ortho standpoint. Will plan on f/u 2 weeks either with portable xray from facility or in office.   5: Toe touch weight bearing    Clay Norris

## 2020-12-03 NOTE — PROGRESS NOTES
Occupational Therapy   Occupational Therapy Initial Assessment  Date: 12/3/2020   Patient Name: Lobito Vargas  MRN: [id number]     : 1935    Date of Service: 12/3/2020    Discharge Recommendations:          Assessment   Performance deficits / Impairments: Decreased cognition;Decreased safe awareness;Decreased ADL status; Decreased functional mobility ; Decreased endurance;Decreased balance  Assessment: Will progress as tolerated. Cognitive deficits inhibit ability to follow directions  or learn hip precautions  Treatment Diagnosis: r hip fx, s/p ozzie hip  Prognosis: Good  Decision Making: Low Complexity  REQUIRES OT FOLLOW UP: Yes  Activity Tolerance  Activity Tolerance: Patient Tolerated treatment well           Patient Diagnosis(es): There were no encounter diagnoses. has a past medical history of Abdominal aortic aneurysm (AAA) ([facility] [address] Chronic kidney disease, Dementia ([facility] [address] Dementia ([facility] [address] Hyperlipidemia, Hypertension, and Movement disorder. has a past surgical history that includes fracture surgery.     Treatment Diagnosis: r hip fx, s/p ozzie hip      Restrictions  Restrictions/Precautions  Restrictions/Precautions: Weight Bearing  Lower Extremity Weight Bearing Restrictions  Right Lower Extremity Weight Bearing: Partial Weight Bearing  Position Activity Restriction  Hip Precautions: Posterior hip precautions    Subjective   General  Chart Reviewed: Yes  Patient assessed for rehabilitation services?: Yes  Family / Caregiver Present: No  Diagnosis: R hip fx, s/ ozzie hip  Patient Currently in Pain: Yes  Pain Assessment  Pain Assessment: Faces  Cherry-Baker Pain Rating: Hurts little more  Vital Signs  Temp: 97.5 °F (36.4 °C)  Temp Source: Temporal  Pulse: 82  Heart Rate Source: Monitor  Resp: 18  BP: 138/64  BP Location: Right Arm  Patient Currently in Pain: Yes  Oxygen Therapy  SpO2: 98 %  O2 Device: Nasal cannula  Social/Functional History  Social/Functional History  Type of Home: Facility  Additional Comments: Pt. confused re PLOF. Feels she is independent and lives alone       Objective   Vision: Within Functional Limits  Hearing: Within functional limits    Orientation  Overall Orientation Status: Within Normal Limits        ADL  Feeding: Supervision;Setup  Grooming: Minimal assistance  UE Bathing: Minimal assistance  LE Bathing: Maximum assistance  UE Dressing: Minimal assistance  LE Dressing: Maximum assistance  Toileting: Maximum assistance           Transfers  Stand Step Transfers: Minimal assistance;2 Person assistance  Sit to stand: Minimal assistance  Transfer Comments: Some confusion noted with following instructions     Cognition  Overall Cognitive Status: Exceptions  Following Commands: Inconsistently follows commands  Attention Span: Difficulty dividing attention  Memory: Decreased short term memory;Decreased long term memory  Safety Judgement: Decreased awareness of need for safety;Decreased awareness of need for assistance  Insights: Decreased awareness of deficits  Initiation: Requires cues for all  Sequencing: Requires cues for some                 LUE AROM (degrees)  LUE AROM : WFL  RUE AROM (degrees)  RUE AROM : WFL  LUE Strength  Gross LUE Strength: WNL  RUE Strength  Gross RUE Strength:  WNL                   Plan   Plan  Times per week: 3-5x/week  Times per day: Daily    G-Code     OutComes Score                                                  AM-PAC Score             Goals  Short term goals  Time Frame for Short term goals: 1 week  Short term goal 1: CGA with BSc tfers  Short term goal 2: CGA with managing clothes for toileting while standing  Long term goals  Long term goal 1: Return to PLOF       Therapy Time   Individual Concurrent Group Co-treatment   Time In           Time Out           Minutes                   Berta Kitchen, OTR/L

## 2020-12-03 NOTE — CARE COORDINATION
Pt is a resident from [facility]. Pt may DC on Friday December 4th if medically cleared.    [address] [place]  [phone] phone  [phone] fax  Electronically signed by Luis A Mendoza on 12/3/2020 at 9:46 AM

## 2020-12-03 NOTE — PROGRESS NOTES
Occupational Therapy  Facility/Department: [facility] SURG SERVICES  Daily Treatment Note  NAME: Claudette Lab  : 1935  MRN: [id number]    Date of Service: 12/3/2020    Discharge Recommendations:          Assessment   Performance deficits / Impairments: Decreased cognition;Decreased safe awareness;Decreased ADL status; Decreased functional mobility ; Decreased endurance;Decreased balance  Assessment: Pt. participated well but needs frequent cueing  Treatment Diagnosis: r hip fx, s/p ozzie hip  Prognosis: Good  Decision Making: Low Complexity  REQUIRES OT FOLLOW UP: Yes  Activity Tolerance  Activity Tolerance: Patient Tolerated treatment well         Patient Diagnosis(es): There were no encounter diagnoses. has a past medical history of Abdominal aortic aneurysm (AAA) ([facility] [address] Chronic kidney disease, Dementia ([facility] [address] Dementia ([facility] [address] Hyperlipidemia, Hypertension, and Movement disorder. has a past surgical history that includes fracture surgery. Restrictions  Restrictions/Precautions  Restrictions/Precautions: Weight Bearing  Lower Extremity Weight Bearing Restrictions  Right Lower Extremity Weight Bearing: Partial Weight Bearing  Position Activity Restriction  Hip Precautions: Posterior hip precautions  Subjective   General  Chart Reviewed: Yes  Patient assessed for rehabilitation services?: Yes  Family / Caregiver Present: No  Diagnosis: R hip fx, s/ ozzie hip  General Comment  Comments: Pt. up several hours now ready to get back to bed. Now pt.  is WBAT  Pain Assessment  Pain Assessment: Faces  Cherry-Baker Pain Rating: Hurts little more  Vital Signs  Patient Currently in Pain: Yes   Orientation  Orientation  Overall Orientation Status: Within Normal Limits  Objective    ADL  Feeding: Supervision;Setup  Grooming: Minimal assistance  UE Bathing: Minimal assistance  LE Bathing: Maximum assistance  UE Dressing: Minimal assistance  LE Dressing: Maximum assistance  Toileting: Maximum assistance Transfers  Stand Step Transfers: Minimal assistance;2 Person assistance  Sit to stand: Minimal assistance  Transfer Comments: Some confusion noted with following instructions                       Cognition  Overall Cognitive Status: Exceptions  Following Commands: Inconsistently follows commands  Attention Span: Difficulty dividing attention  Memory: Decreased short term memory;Decreased long term memory  Safety Judgement: Decreased awareness of need for safety;Decreased awareness of need for assistance  Insights: Decreased awareness of deficits  Initiation: Requires cues for all  Sequencing: Requires cues for some                       LUE AROM (degrees)  LUE AROM : WFL  RUE AROM (degrees)  RUE AROM : WFL                 Plan   Plan  Times per week: 3-5x/week  Times per day: Daily  G-Code     OutComes Score                                                  AM-PAC Score             Goals  Short term goals  Time Frame for Short term goals: 1 week  Short term goal 1: CGA with BSc tfers  Short term goal 2: CGA with managing clothes for toileting while standing  Long term goals  Long term goal 1: Return to PLOF       Therapy Time   Individual Concurrent Group Co-treatment   Time In           Time Out           Minutes                   Maren Castaneda OT

## 2020-12-03 NOTE — PROGRESS NOTES
Progress Note  Date:12/3/2020       Room:0505/505-01  Patient Name:Elena Thomas     YOB: 1935     Age:85 y.o. Subjective    Subjective: 74-year-old lady with a history of severe dementia, hyperlipidemia, hypertension, movement disorder, presented from a nursing home with concerns of fall with resultant right hip fracture. Unable to obtain history from patient hence obtained from outside facility documentation. Patient presented from nursing home status post fall, at outside facility was noted to have right hip fracture. Patient's case was discussed with orthopedics who accepted for consult hence transferred over to our facility for further evaluation. Patient is s/p Right Hip Hemiarthroplasty for Right subcapital femoral neck fracture. Per ortho can discharge when stable and follow up in clinic in 2 weeks. Awaiting therapy eval.       Review of Systems: 12 point system reviewed and negative except as stated above. Objective         Vitals Last 24 Hours:  TEMPERATURE:  Temp  Av.8 °F (36.6 °C)  Min: 97.1 °F (36.2 °C)  Max: 99.6 °F (37.6 °C)  RESPIRATIONS RANGE: Resp  Av.7  Min: 1  Max: 57  PULSE OXIMETRY RANGE: SpO2  Av.2 %  Min: 79 %  Max: 100 %  PULSE RANGE: Pulse  Av.3  Min: 70  Max: 87  BLOOD PRESSURE RANGE: Systolic (40SMT), NZN:376 , Min:69 , OUJ:545   ; Diastolic (03XQM), SBB:89, Min:33, Max:101    I/O (24Hr): Intake/Output Summary (Last 24 hours) at 12/3/2020 1236  Last data filed at 12/3/2020 0846  Gross per 24 hour   Intake 3015 ml   Output 1375 ml   Net 1640 ml       Physical Exam:  General: Alert, well-developed, no acute distress lying comfortably in bed. HEENT: Atraumatic normocephalic, range of motion normal, no tracheal deviation noted. Cardiac: Normal T7-F2 with systolic murmur.   Respiratory: From supine position, effort normal, breath sounds normal, clear To auscultation bilaterally, no rhonchi or rales, no wheezing  Abdomen: Soft, positive bowel sounds in all quadrants, no distention, nontender to palpation, no organomegaly noted, no rebound noted. MSK/extremities: no edema, no deformity, Minimal right lower extremity movement. Right hip incision intact  Skin: Warm, no erythema noted, no bruising and no lesions noted. Psych: Affect normal and good eye contact, behavioral normal  Neurological: No focal deficits, alert and conversant, No sensory deficits. Labs/Imaging/Diagnostics    Labs:  CBC:  Recent Labs     12/02/20  0729 12/03/20  0429   WBC 14.2* 9.6   RBC 3.39* 3.02*   HGB 10.2* 9.1*   HCT 31.8* 29.4*   MCV 93.8 97.4   RDW 13.2 13.3    247     CHEMISTRIES:  Recent Labs     12/02/20  0729 12/03/20  0728    141   K 4.4 4.2    109   CO2 26 27   BUN 30* 25*   CREATININE 1.0* 0.9   GLUCOSE 147* 120*     PT/INR:No results for input(s): PROTIME, INR in the last 72 hours. APTT:No results for input(s): APTT in the last 72 hours. LIVER PROFILE:No results for input(s): AST, ALT, BILIDIR, BILITOT, ALKPHOS in the last 72 hours. Imaging Last 24 Hours:  No results found. Assessment//Plan           Hospital Problems           Last Modified POA    Hip fracture, right, closed, initial encounter ([facility] [address] 12/2/2020 Yes        Assessment & Plan    Right hip fracture status post fall in setting of movement disorder. s/p Right Hip Hemiarthroplasty for Right subcapital femoral neck fracture. Anticoagulation with enoxaparin then xarelto on discharge for 21 days.   Awaiting PT OT eval  Pain management  Bowel regimen to prevent constipation.     Hypertension: Continue lisinopril     Hyperlipidemia: Statin.     UTI: Continue ceftriaxone for total of 3 days.     Chronic kidney disease     GERD: Pantoprazole     Dementia: continue Aricept and namenda        Code: Full  DVT prophylaxis: Per Ortho recommendations post surgery  Diet: regular  Disposition: SNF tomorrow         Electronically signed by Geneva Ervin MD on 12/3/20 at 12:36 PM CST

## 2020-12-03 NOTE — PROGRESS NOTES
AZEB)    G-Code       OutComes Score                                                  AM-PAC Score             Goals  Short term goals  Time Frame for Short term goals: 2 wks  Short term goal 1: SUP<>SIT, MIN/MOD A 1  Short term goal 2: SIT<>STAND, MIN A 1  Short term goal 3: STAND STEP TF'S WITH RW, MIN A 1       Therapy Time   Individual Concurrent Group Co-treatment   Time In           Time Out           Minutes                   Flakita Anthony PT    Electronically signed by Flakita Anthony PT on 12/3/2020 at 12:20 PM

## 2020-12-04 LAB
ABO/RH: NORMAL
ANTIBODY SCREEN: NORMAL
BASOPHILS ABSOLUTE: 0 K/UL (ref 0–0.2)
BASOPHILS RELATIVE PERCENT: 0.4 % (ref 0–1)
BLOOD BANK DISPENSE STATUS: NORMAL
BLOOD BANK DISPENSE STATUS: NORMAL
BLOOD BANK PRODUCT CODE: NORMAL
BLOOD BANK PRODUCT CODE: NORMAL
BPU ID: NORMAL
BPU ID: NORMAL
DESCRIPTION BLOOD BANK: NORMAL
DESCRIPTION BLOOD BANK: NORMAL
EOSINOPHILS ABSOLUTE: 0.1 K/UL (ref 0–0.6)
EOSINOPHILS RELATIVE PERCENT: 1.2 % (ref 0–5)
HCT VFR BLD CALC: 24 % (ref 37–47)
HCT VFR BLD CALC: 29.6 % (ref 37–47)
HEMOGLOBIN: 7.5 G/DL (ref 12–16)
HEMOGLOBIN: 9.4 G/DL (ref 12–16)
IMMATURE GRANULOCYTES #: 0.1 K/UL
LYMPHOCYTES ABSOLUTE: 0.9 K/UL (ref 1.1–4.5)
LYMPHOCYTES RELATIVE PERCENT: 8.6 % (ref 20–40)
MCH RBC QN AUTO: 30 PG (ref 27–31)
MCHC RBC AUTO-ENTMCNC: 31.3 G/DL (ref 33–37)
MCV RBC AUTO: 96 FL (ref 81–99)
MONOCYTES ABSOLUTE: 0.9 K/UL (ref 0–0.9)
MONOCYTES RELATIVE PERCENT: 9.2 % (ref 0–10)
NEUTROPHILS ABSOLUTE: 7.8 K/UL (ref 1.5–7.5)
NEUTROPHILS RELATIVE PERCENT: 79.3 % (ref 50–65)
PDW BLD-RTO: 13.4 % (ref 11.5–14.5)
PLATELET # BLD: 180 K/UL (ref 130–400)
PMV BLD AUTO: 10.9 FL (ref 9.4–12.3)
RBC # BLD: 2.5 M/UL (ref 4.2–5.4)
TROPONIN: 0.04 NG/ML (ref 0–0.03)
TROPONIN: 0.05 NG/ML (ref 0–0.03)
WBC # BLD: 9.9 K/UL (ref 4.8–10.8)

## 2020-12-04 PROCEDURE — 2700000000 HC OXYGEN THERAPY PER DAY

## 2020-12-04 PROCEDURE — 85025 COMPLETE CBC W/AUTO DIFF WBC: CPT

## 2020-12-04 PROCEDURE — P9016 RBC LEUKOCYTES REDUCED: HCPCS

## 2020-12-04 PROCEDURE — 93005 ELECTROCARDIOGRAM TRACING: CPT | Performed by: HOSPITALIST

## 2020-12-04 PROCEDURE — 2580000003 HC RX 258: Performed by: HOSPITALIST

## 2020-12-04 PROCEDURE — 86901 BLOOD TYPING SEROLOGIC RH(D): CPT

## 2020-12-04 PROCEDURE — 86850 RBC ANTIBODY SCREEN: CPT

## 2020-12-04 PROCEDURE — 86900 BLOOD TYPING SEROLOGIC ABO: CPT

## 2020-12-04 PROCEDURE — 85014 HEMATOCRIT: CPT

## 2020-12-04 PROCEDURE — 86923 COMPATIBILITY TEST ELECTRIC: CPT

## 2020-12-04 PROCEDURE — 97530 THERAPEUTIC ACTIVITIES: CPT

## 2020-12-04 PROCEDURE — 6370000000 HC RX 637 (ALT 250 FOR IP): Performed by: ORTHOPAEDIC SURGERY

## 2020-12-04 PROCEDURE — 84484 ASSAY OF TROPONIN QUANT: CPT

## 2020-12-04 PROCEDURE — 85018 HEMOGLOBIN: CPT

## 2020-12-04 PROCEDURE — 36430 TRANSFUSION BLD/BLD COMPNT: CPT

## 2020-12-04 PROCEDURE — 36415 COLL VENOUS BLD VENIPUNCTURE: CPT

## 2020-12-04 PROCEDURE — 6360000002 HC RX W HCPCS: Performed by: ORTHOPAEDIC SURGERY

## 2020-12-04 PROCEDURE — 1210000000 HC MED SURG R&B

## 2020-12-04 PROCEDURE — 30233N1 TRANSFUSION OF NONAUTOLOGOUS RED BLOOD CELLS INTO PERIPHERAL VEIN, PERCUTANEOUS APPROACH: ICD-10-PCS | Performed by: HOSPITALIST

## 2020-12-04 RX ORDER — 0.9 % SODIUM CHLORIDE 0.9 %
20 INTRAVENOUS SOLUTION INTRAVENOUS ONCE
Status: COMPLETED | OUTPATIENT
Start: 2020-12-04 | End: 2020-12-04

## 2020-12-04 RX ADMIN — CHOLECALCIFEROL (VITAMIN D3) 25 MCG (1,000 UNIT) TABLET 2000 UNITS: TABLET at 10:06

## 2020-12-04 RX ADMIN — SODIUM CHLORIDE 20 ML: 9 INJECTION, SOLUTION INTRAVENOUS at 11:13

## 2020-12-04 RX ADMIN — ENOXAPARIN SODIUM 40 MG: 40 INJECTION SUBCUTANEOUS at 10:05

## 2020-12-04 RX ADMIN — PANTOPRAZOLE SODIUM 40 MG: 40 TABLET, DELAYED RELEASE ORAL at 10:06

## 2020-12-04 RX ADMIN — ATORVASTATIN CALCIUM 20 MG: 20 TABLET, FILM COATED ORAL at 10:05

## 2020-12-04 RX ADMIN — DOCUSATE SODIUM 50 MG AND SENNOSIDES 8.6 MG 1 TABLET: 8.6; 5 TABLET, FILM COATED ORAL at 10:06

## 2020-12-04 RX ADMIN — ESCITALOPRAM OXALATE 10 MG: 10 TABLET ORAL at 10:05

## 2020-12-04 RX ADMIN — LISINOPRIL 10 MG: 10 TABLET ORAL at 10:05

## 2020-12-04 RX ADMIN — TRAMADOL HYDROCHLORIDE 50 MG: 50 TABLET, FILM COATED ORAL at 10:06

## 2020-12-04 ASSESSMENT — PAIN DESCRIPTION - PAIN TYPE
TYPE: SURGICAL PAIN
TYPE: SURGICAL PAIN

## 2020-12-04 ASSESSMENT — PAIN SCALES - WONG BAKER
WONGBAKER_NUMERICALRESPONSE: 6
WONGBAKER_NUMERICALRESPONSE: 6

## 2020-12-04 ASSESSMENT — PAIN DESCRIPTION - LOCATION
LOCATION: HIP
LOCATION: HIP

## 2020-12-04 ASSESSMENT — PAIN DESCRIPTION - ORIENTATION
ORIENTATION: RIGHT
ORIENTATION: RIGHT

## 2020-12-04 ASSESSMENT — PAIN SCALES - GENERAL: PAINLEVEL_OUTOF10: 3

## 2020-12-04 NOTE — PROGRESS NOTES
alarm in place;Call light within reach;Gait belt;Left in bed         Electronically signed by Joyce Edmonds PTA on 12/4/2020 at 3:35 PM

## 2020-12-04 NOTE — CARE COORDINATION
Pt is a resident from [facility]. Pt may DC on Saturday December 5th if medically cleared. The facility will need med rec by 1:00 PM Saturday.    [address] [place]  [phone] phone  [phone] fax  Electronically signed by Rubén Ortiz on 12/3/2020 at 9:46 AM

## 2020-12-04 NOTE — PROGRESS NOTES
Progress Note  Date:2020       Room:0505/505-01  Patient Name:Elena Santos     YOB: 1935     Age:85 y.o. Subjective    Subjective: 56-year-old lady with a history of severe dementia, hyperlipidemia, hypertension, movement disorder, presented from a nursing home with concerns of fall with resultant right hip fracture. Unable to obtain history from patient hence obtained from outside facility documentation. Patient presented from nursing home status post fall, at outside facility was noted to have right hip fracture. Patient's case was discussed with orthopedics who accepted for consult hence transferred over to our facility for further evaluation. Patient is s/p Right Hip Hemiarthroplasty for Right subcapital femoral neck fracture. Per ortho can discharge when stable and follow up in clinic in 2 weeks. Patient complaining of chest discomfort this morning, on assessment, patient stated that the pain has resolved however troponin, EKG as well as CBC ordered    Review of Systems: 12 point system reviewed and negative except as stated above. Objective         Vitals Last 24 Hours:  TEMPERATURE:  Temp  Av.9 °F (37.2 °C)  Min: 98 °F (36.7 °C)  Max: 100.1 °F (37.8 °C)  RESPIRATIONS RANGE: Resp  Av.7  Min: 16  Max: 22  PULSE OXIMETRY RANGE: SpO2  Av %  Min: 91 %  Max: 99 %  PULSE RANGE: Pulse  Av.6  Min: 79  Max: 122  BLOOD PRESSURE RANGE: Systolic (67XMP), IPL:366 , Min:101 , RAP:565   ; Diastolic (52DYB), FKU:05, Min:42, Max:61    I/O (24Hr): Intake/Output Summary (Last 24 hours) at 2020 1116  Last data filed at 2020 0814  Gross per 24 hour   Intake --   Output 1050 ml   Net -1050 ml       Physical Exam:  General: Alert, well-developed, no acute distress lying comfortably in bed. HEENT: Atraumatic normocephalic, range of motion normal, no tracheal deviation noted. Cardiac: Normal T5-P4 with systolic murmur.   Respiratory: From supine position, effort normal, breath sounds normal, clear To auscultation bilaterally, no rhonchi or rales, no wheezing  Abdomen: Soft, positive bowel sounds in all quadrants, no distention, nontender to palpation, no organomegaly noted, no rebound noted. MSK/extremities: no edema, no deformity, Minimal right lower extremity movement. Right hip incision intact  Skin: Warm, no erythema noted, no bruising and no lesions noted. Psych: Affect normal and good eye contact, behavioral normal  Neurological: No focal deficits, alert and conversant, No sensory deficits. Labs/Imaging/Diagnostics    Labs:  CBC:  Recent Labs     12/02/20  0729 12/03/20  0429 12/04/20  0815   WBC 14.2* 9.6 9.9   RBC 3.39* 3.02* 2.50*   HGB 10.2* 9.1* 7.5*   HCT 31.8* 29.4* 24.0*   MCV 93.8 97.4 96.0   RDW 13.2 13.3 13.4    247 180     CHEMISTRIES:  Recent Labs     12/02/20  0729 12/03/20  0728    141   K 4.4 4.2    109   CO2 26 27   BUN 30* 25*   CREATININE 1.0* 0.9   GLUCOSE 147* 120*     PT/INR:No results for input(s): PROTIME, INR in the last 72 hours. APTT:No results for input(s): APTT in the last 72 hours. LIVER PROFILE:No results for input(s): AST, ALT, BILIDIR, BILITOT, ALKPHOS in the last 72 hours. Imaging Last 24 Hours:  No results found. Assessment//Plan           Hospital Problems           Last Modified POA    Hip fracture, right, closed, initial encounter ([facility] [address] 12/2/2020 Yes        Assessment & Plan    Nonspecific chest pain  Chest pain now resolved per patient. Obtain troponin, and EKG. Obtain CBC as patient with recent surgery and concerns of decreased perfusion secondary to postop anemia. Hemoglobin noted to be 7.5 decreased from almost a level at admission. Ordered 2 units of packed red blood cells for transfusion. Obtain hemoglobin levels posttransfusion. Right hip fracture status post fall in setting of movement disorder. s/p Right Hip Hemiarthroplasty for Right subcapital femoral neck fracture. Anticoagulation with enoxaparin then xarelto on discharge for 21 days on discharge. PT OT eval  Pain management  Bowel regimen to prevent constipation.     Hypertension: Continue lisinopril     Hyperlipidemia: Statin.     UTI: Continue ceftriaxone for total of 3 days.     Chronic kidney disease     GERD: Pantoprazole     Dementia: continue Aricept and namenda        Code: Full  DVT prophylaxis: Per Ortho recommendations post surgery  Diet: regular  Disposition: SNF tomorrow         Electronically signed by   Theo Ramirez   Internal Medicine Hospitalist  On 12/4/2020  At 11:22 AM    EMR Dragon/Transcription disclaimer:   Much of this encounter note is an electronic transcription/translation of spoken language to printed text.  The electronic translation of spoken language may permit erroneous, or at times, nonsensical words or phrases to be inadvertently transcribed; although attempts have made to review the note for such errors, some may still exist.

## 2020-12-05 VITALS
SYSTOLIC BLOOD PRESSURE: 100 MMHG | WEIGHT: 169 LBS | HEART RATE: 96 BPM | HEIGHT: 66 IN | BODY MASS INDEX: 27.16 KG/M2 | TEMPERATURE: 97 F | OXYGEN SATURATION: 94 % | DIASTOLIC BLOOD PRESSURE: 53 MMHG | RESPIRATION RATE: 14 BRPM

## 2020-12-05 LAB
ANION GAP SERPL CALCULATED.3IONS-SCNC: 6 MMOL/L (ref 7–19)
BUN BLDV-MCNC: 22 MG/DL (ref 8–23)
CALCIUM SERPL-MCNC: 9.3 MG/DL (ref 8.8–10.2)
CHLORIDE BLD-SCNC: 107 MMOL/L (ref 98–111)
CO2: 25 MMOL/L (ref 22–29)
CREAT SERPL-MCNC: 0.9 MG/DL (ref 0.5–0.9)
EKG P AXIS: 74 DEGREES
EKG P-R INTERVAL: 146 MS
EKG Q-T INTERVAL: 322 MS
EKG QRS DURATION: 98 MS
EKG QTC CALCULATION (BAZETT): 400 MS
EKG T AXIS: -45 DEGREES
GFR AFRICAN AMERICAN: >59
GFR NON-AFRICAN AMERICAN: 60
GLUCOSE BLD-MCNC: 113 MG/DL (ref 74–109)
HCT VFR BLD CALC: 31.1 % (ref 37–47)
HEMOGLOBIN: 9.8 G/DL (ref 12–16)
MCH RBC QN AUTO: 29.2 PG (ref 27–31)
MCHC RBC AUTO-ENTMCNC: 31.5 G/DL (ref 33–37)
MCV RBC AUTO: 92.6 FL (ref 81–99)
PDW BLD-RTO: 14.8 % (ref 11.5–14.5)
PLATELET # BLD: 174 K/UL (ref 130–400)
PMV BLD AUTO: 11.3 FL (ref 9.4–12.3)
POTASSIUM REFLEX MAGNESIUM: 3.7 MMOL/L (ref 3.5–5)
RBC # BLD: 3.36 M/UL (ref 4.2–5.4)
SODIUM BLD-SCNC: 138 MMOL/L (ref 136–145)
TROPONIN: 0.05 NG/ML (ref 0–0.03)
WBC # BLD: 9.3 K/UL (ref 4.8–10.8)

## 2020-12-05 PROCEDURE — 93010 ELECTROCARDIOGRAM REPORT: CPT | Performed by: INTERNAL MEDICINE

## 2020-12-05 PROCEDURE — 36415 COLL VENOUS BLD VENIPUNCTURE: CPT

## 2020-12-05 PROCEDURE — 2700000000 HC OXYGEN THERAPY PER DAY

## 2020-12-05 PROCEDURE — 85027 COMPLETE CBC AUTOMATED: CPT

## 2020-12-05 PROCEDURE — 6360000002 HC RX W HCPCS: Performed by: ORTHOPAEDIC SURGERY

## 2020-12-05 PROCEDURE — 84484 ASSAY OF TROPONIN QUANT: CPT

## 2020-12-05 PROCEDURE — 6370000000 HC RX 637 (ALT 250 FOR IP): Performed by: ORTHOPAEDIC SURGERY

## 2020-12-05 PROCEDURE — 2580000003 HC RX 258: Performed by: ORTHOPAEDIC SURGERY

## 2020-12-05 PROCEDURE — 80048 BASIC METABOLIC PNL TOTAL CA: CPT

## 2020-12-05 RX ORDER — RIVAROXABAN 10 MG/1
10 TABLET, FILM COATED ORAL
Qty: 21 TABLET | Refills: 0 | Status: SHIPPED | OUTPATIENT
Start: 2020-12-05 | End: 2020-12-26

## 2020-12-05 RX ORDER — TRAMADOL HYDROCHLORIDE 50 MG/1
50 TABLET ORAL 2 TIMES DAILY
Qty: 6 TABLET | Refills: 0 | Status: SHIPPED | OUTPATIENT
Start: 2020-12-05 | End: 2020-12-08

## 2020-12-05 RX ADMIN — PANTOPRAZOLE SODIUM 40 MG: 40 TABLET, DELAYED RELEASE ORAL at 08:05

## 2020-12-05 RX ADMIN — DOCUSATE SODIUM 50 MG AND SENNOSIDES 8.6 MG 1 TABLET: 8.6; 5 TABLET, FILM COATED ORAL at 00:04

## 2020-12-05 RX ADMIN — ESCITALOPRAM OXALATE 10 MG: 10 TABLET ORAL at 08:05

## 2020-12-05 RX ADMIN — DOCUSATE SODIUM 50 MG AND SENNOSIDES 8.6 MG 1 TABLET: 8.6; 5 TABLET, FILM COATED ORAL at 08:05

## 2020-12-05 RX ADMIN — TRAMADOL HYDROCHLORIDE 50 MG: 50 TABLET, FILM COATED ORAL at 00:04

## 2020-12-05 RX ADMIN — MEMANTINE HYDROCHLORIDE 28 MG: 28 CAPSULE, EXTENDED RELEASE ORAL at 00:04

## 2020-12-05 RX ADMIN — CEFTRIAXONE 1 G: 1 INJECTION, POWDER, FOR SOLUTION INTRAMUSCULAR; INTRAVENOUS at 08:03

## 2020-12-05 RX ADMIN — TRAMADOL HYDROCHLORIDE 50 MG: 50 TABLET, FILM COATED ORAL at 08:05

## 2020-12-05 RX ADMIN — SODIUM CHLORIDE, PRESERVATIVE FREE 10 ML: 5 INJECTION INTRAVENOUS at 00:04

## 2020-12-05 RX ADMIN — ATORVASTATIN CALCIUM 20 MG: 20 TABLET, FILM COATED ORAL at 08:05

## 2020-12-05 RX ADMIN — SODIUM CHLORIDE, PRESERVATIVE FREE 10 ML: 5 INJECTION INTRAVENOUS at 08:05

## 2020-12-05 RX ADMIN — DONEPEZIL HYDROCHLORIDE 10 MG: 5 TABLET, FILM COATED ORAL at 00:04

## 2020-12-05 RX ADMIN — CHOLECALCIFEROL (VITAMIN D3) 25 MCG (1,000 UNIT) TABLET 2000 UNITS: TABLET at 08:05

## 2020-12-05 RX ADMIN — ENOXAPARIN SODIUM 40 MG: 40 INJECTION SUBCUTANEOUS at 08:04

## 2020-12-05 ASSESSMENT — PAIN SCALES - GENERAL
PAINLEVEL_OUTOF10: 6
PAINLEVEL_OUTOF10: 0

## 2020-12-05 NOTE — PROGRESS NOTES
Physician Progress Note      Yann Marie  CSN #:                  [id number]  :                       1935  ADMIT DATE:       2020 6:10 AM  DISCH DATE:  RESPONDING  PROVIDER #:        Jade Kirkland MD          QUERY TEXT:    Pt admitted s/p hemiarthroplasty right hip and has anemia documented. If   possible, please document in progress notes and discharge summary further   specificity regarding the acuity and type of anemia:    The medical record reflects the following:  Risk Factors: Right hip fracture, hemiarthroplasty, kidney disease  Clinical Indicators: H&H  10.2/31.8  9.1/29.4  7.5/24.0, chest pain  Treatment: CBC, transfusion order for 2 units PRBC  Options provided:  -- Anemia due to acute blood loss  -- Anemia due to postoperative blood loss  -- Dilutional anemia  -- Other - I will add my own diagnosis  -- Disagree - Not applicable / Not valid  -- Disagree - Clinically unable to determine / Unknown  -- Refer to Clinical Documentation Reviewer    PROVIDER RESPONSE TEXT:    This patient has acute blood loss anemia.     Query created by: Whitley Dumont on 2020 1:14 PM      Electronically signed by:  Jade Kirkland MD 2020 10:48 AM

## 2020-12-05 NOTE — PROGRESS NOTES
Aquilino Vázquez LPN from [place] [id number] stated patient does not need a repeat COVID test prior to discharge.

## 2020-12-05 NOTE — PROGRESS NOTES
Patient being discharged to [facility] and [facility]. Report called to [facility]. [facility] called for patient transport. Patient stable and ready for discharge.

## 2020-12-05 NOTE — DISCHARGE SUMMARY
conversant, No sensory deficits. Consultants:   IP CONSULT TO ORTHOPEDIC SURGERY  IP CONSULT TO SOCIAL WORK  IP CONSULT TO SOCIAL WORK    Time Spent on Discharge:  >30 minutes were spent in patient examination, evaluation, counseling as well as medication reconciliation, prescriptions for required medications, discharge plan and follow up. Surgeries/Procedures Performed:  Procedure(s):  HIP HEMIARTHROPLASTY       Significant Diagnostic Studies:   Recent Labs:  CBC:   Lab Results   Component Value Date    WBC 9.3 12/05/2020    RBC 3.36 12/05/2020    HGB 9.8 12/05/2020    HCT 31.1 12/05/2020    MCV 92.6 12/05/2020    MCH 29.2 12/05/2020    MCHC 31.5 12/05/2020    RDW 14.8 12/05/2020     12/05/2020     BMP:    Lab Results   Component Value Date    GLUCOSE 113 12/05/2020     12/05/2020    K 3.7 12/05/2020     12/05/2020    CO2 25 12/05/2020    ANIONGAP 6 12/05/2020    BUN 22 12/05/2020    CREATININE 0.9 12/05/2020    CALCIUM 9.3 12/05/2020    LABGLOM 60 12/05/2020    GFRAA >59 12/05/2020       Radiology Last 7 Days:  No results found. Discharge Plan   Disposition: To a non-[facility] facility    Provider Follow-Up:   No follow-up provider specified.        Patient Instructions   Diet: regular diet    Activity: activity as tolerated      Discharge Medications         Medication List      START taking these medications    Xarelto 10 MG Tabs tablet  Generic drug:  rivaroxaban  Take 1 tablet by mouth daily (with breakfast) for 21 days        CONTINUE taking these medications    atorvastatin 20 MG tablet  Commonly known as:  LIPITOR     esomeprazole 40 MG delayed release capsule  Commonly known as:  NEXIUM     Lexapro 10 MG tablet  Generic drug:  escitalopram     lisinopril 10 MG tablet  Commonly known as:  PRINIVIL;ZESTRIL     Myrbetriq 25 MG Tb24  Generic drug:  mirabegron     nabumetone 500 MG tablet  Commonly known as:  RELAFEN     Namzaric 28-10 MG Cp24  Generic drug:  Memantine HCl-Donepezil HCl     senna-docusate 8.6-50 MG per tablet  Commonly known as:  PERICOLACE     traMADol 50 MG tablet  Commonly known as:  ULTRAM  Take 1 tablet by mouth 2 times daily for 3 days.      Vitamin D3 50 MCG (2000 UT) Caps           Where to Get Your Medications      These medications were sent to [facility], [facility], [address] [address]    Phone:  [phone]   · Xarelto 10 MG Tabs tablet     You can get these medications from any pharmacy    Bring a paper prescription for each of these medications  · traMADol 50 MG tablet         Electronically signed by Lucio Ford MD on 12/5/20 at 9:49 AM CST

## 2020-12-10 ENCOUNTER — TELEPHONE (OUTPATIENT)
Dept: INPATIENT UNIT | Age: 85
End: 2020-12-10

## 2021-05-09 NOTE — PROGRESS NOTES
Physical Therapy  Name: Demetra Curtis  MRN:  [id number]  Date of service:  12/4/2020 12/04/20 1352   Restrictions/Precautions   Restrictions/Precautions Weight Bearing   Lower Extremity Weight Bearing Restrictions   Right Lower Extremity Weight Bearing Weight Bearing As Tolerated   Position Activity Restriction   Hip Precautions Posterior hip precautions   General   Chart Reviewed Yes   Subjective   Subjective Pt in bed, agrees to get up to the chair for a while. Pain Screening   Patient Currently in Pain Yes   Pain Assessment   Pain Assessment Faces   Cherry-Joshi Pain Rating 6   Pain Type Surgical pain   Pain Location Hip   Pain Orientation Right   Oxygen Therapy   O2 Device Nasal cannula   O2 Flow Rate (L/min) 2 L/min   Bed Mobility   Supine to Sit Moderate assistance   Transfers   Sit to Stand Moderate Assistance;2 Person Assistance  (extra time needed)   Stand to sit Minimal Assistance;2 Person Assistance   Bed to Chair Moderate assistance;2 Person Assistance  (extra time needed)   Short term goals   Time Frame for Short term goals 2 wks   Short term goal 1 SUP<>SIT, MIN/MOD A 1   Short term goal 2 SIT<>STAND, MIN A 1   Short term goal 3 STAND STEP TF'S WITH RW, MIN A 1   Conditions Requiring Skilled Therapeutic Intervention   Body structures, Functions, Activity limitations Decreased functional mobility ; Decreased endurance; Increased pain;Decreased strength;Decreased posture;Decreased safe awareness;Decreased cognition   Assessment Pt able to stand/step from bed to recliner but moves very slowly d/t pain and requires extra time to perform all movements. Pt becomes very fatigued quickly and chair must be moved closer to her. Pt positioned for comfort with all needs in reach.    Activity Tolerance   Activity Tolerance Patient limited by fatigue;Patient limited by pain   Safety Devices   Type of devices Call light within reach;Gait belt;Left in chair         Electronically signed by Yoli Raya PTA on 12/4/2020 at 1:56 PM Statement Selected

## (undated) DEVICE — GLOVE SURG SZ 7 CRM LTX FREE POLYISOPRENE POLYMER BEAD ANTI

## (undated) DEVICE — LARYNGOSCOPE BLDE MAC HNDL M SZ 35 ST CURAPLEX CURAVIEW LED

## (undated) DEVICE — Z INACTIVE USE 2660664 SOLUTION IRRIG 3000ML 0.9% SOD CHL USP UROMATIC PLAS CONT

## (undated) DEVICE — SUTURE VCRL SZ 2-0 L36IN ABSRB UD L36MM CT-1 1/2 CIR J945H

## (undated) DEVICE — SUTURE VCRL SZ 3-0 L27IN ABSRB UD L26MM SH 1/2 CIR J416H

## (undated) DEVICE — PILLOW POS W15XH6XL22IN RASPBERRY FOAM ABD W/ STRP DISP FOR

## (undated) DEVICE — SUTURE VCRL SZ 1 L27IN ABSRB UD L36MM CP-1 1/2 CIR REV CUT J268H

## (undated) DEVICE — SURGICAL PROCEDURE PACK HIP TOT DBD CDS LOURDES HOSP LTX

## (undated) DEVICE — UNDERGLOVE SURG SZ 8 FNGR THK0.21MIL GRN LTX BEAD CUF

## (undated) DEVICE — STERILE POLYISOPRENE POWDER-FREE SURGICAL GLOVES: Brand: PROTEXIS

## (undated) DEVICE — SYSTEM SKIN CLSR 22CM DERMBND PRINEO

## (undated) DEVICE — TUBE ET 7MM NSL ORAL BASIC CUF INTMED MURPHY EYE RADPQ MRK

## (undated) DEVICE — GLOVE SURG SZ 65 CRM LTX FREE POLYISOPRENE POLYMER BEAD ANTI

## (undated) DEVICE — SUTURE RETRIEVER

## (undated) DEVICE — PILLOW ABDUCTION LEG DISP